# Patient Record
Sex: MALE | Race: WHITE | NOT HISPANIC OR LATINO | URBAN - METROPOLITAN AREA
[De-identification: names, ages, dates, MRNs, and addresses within clinical notes are randomized per-mention and may not be internally consistent; named-entity substitution may affect disease eponyms.]

---

## 2020-11-03 ENCOUNTER — OFFICE VISIT (OUTPATIENT)
Dept: URBAN - METROPOLITAN AREA CLINIC 121 | Facility: CLINIC | Age: 70
End: 2020-11-03

## 2021-01-13 ENCOUNTER — OFFICE VISIT (OUTPATIENT)
Dept: URBAN - METROPOLITAN AREA CLINIC 63 | Facility: CLINIC | Age: 71
End: 2021-01-13

## 2021-11-01 ENCOUNTER — OFFICE VISIT (OUTPATIENT)
Dept: URBAN - METROPOLITAN AREA CLINIC 121 | Facility: CLINIC | Age: 71
End: 2021-11-01

## 2022-02-23 ENCOUNTER — OFFICE VISIT (OUTPATIENT)
Dept: URBAN - METROPOLITAN AREA CLINIC 63 | Facility: CLINIC | Age: 72
End: 2022-02-23

## 2022-07-09 ENCOUNTER — TELEPHONE ENCOUNTER (OUTPATIENT)
Dept: URBAN - METROPOLITAN AREA CLINIC 121 | Facility: CLINIC | Age: 72
End: 2022-07-09

## 2022-07-09 RX ORDER — FAMOTIDINE 20 MG/1
TABLET ORAL
Refills: 0 | OUTPATIENT
Start: 2022-02-16 | End: 2022-02-23

## 2022-07-09 RX ORDER — ASPIRIN 325 MG/1
TABLET ORAL ONCE A DAY
Refills: 0 | OUTPATIENT
Start: 2019-12-26 | End: 2021-01-13

## 2022-07-09 RX ORDER — ASPIRIN 325 MG/1
TABLET ORAL ONCE A DAY
Refills: 0 | OUTPATIENT
Start: 2017-03-03 | End: 2019-12-26

## 2022-07-09 RX ORDER — OMEGA-3S/DHA/EPA/FISH OIL 980-1400MG
CAPSULE,DELAYED RELEASE (ENTERIC COATED) ORAL ONCE A DAY
Refills: 0 | OUTPATIENT
Start: 2017-03-03 | End: 2019-12-26

## 2022-07-09 RX ORDER — OMEGA-3S/DHA/EPA/FISH OIL 980-1400MG
CAPSULE,DELAYED RELEASE (ENTERIC COATED) ORAL ONCE A DAY
Refills: 0 | OUTPATIENT
Start: 2021-01-13 | End: 2022-02-23

## 2022-07-09 RX ORDER — ATENOLOL 50 MG/1
TABLET ORAL ONCE A DAY
Refills: 0 | OUTPATIENT
Start: 2019-12-26 | End: 2021-01-13

## 2022-07-09 RX ORDER — OMEGA-3S/DHA/EPA/FISH OIL 980-1400MG
CAPSULE,DELAYED RELEASE (ENTERIC COATED) ORAL ONCE A DAY
Refills: 0 | OUTPATIENT
Start: 2019-12-26 | End: 2021-01-13

## 2022-07-09 RX ORDER — OMEPRAZOLE 20 MG/1
CAPSULE, DELAYED RELEASE ORAL ONCE A DAY
Refills: 0 | OUTPATIENT
Start: 2019-12-26 | End: 2019-12-26

## 2022-07-09 RX ORDER — TELMISARTAN 40 MG/1
TABLET ORAL ONCE A DAY
Refills: 0 | OUTPATIENT
Start: 2019-09-23 | End: 2019-12-26

## 2022-07-09 RX ORDER — CELECOXIB 200 MG/1
CAPSULE ORAL ONCE A DAY
Refills: 0 | OUTPATIENT
Start: 2021-01-13 | End: 2022-02-23

## 2022-07-09 RX ORDER — AMLODIPINE BESYLATE 5 MG/1
TABLET ORAL ONCE A DAY
Refills: 0 | OUTPATIENT
Start: 2017-03-03 | End: 2019-12-26

## 2022-07-09 RX ORDER — OMEPRAZOLE 20 MG/1
CAPSULE, DELAYED RELEASE ORAL ONCE A DAY
Refills: 0 | OUTPATIENT
Start: 2017-03-03 | End: 2019-12-26

## 2022-07-09 RX ORDER — NIACIN 1000 MG
1500 MG TABLET, EXTENDED RELEASE ORAL TWICE A DAY
Refills: 0 | OUTPATIENT
Start: 2019-12-26 | End: 2019-12-26

## 2022-07-09 RX ORDER — ALLOPURINOL 200 MG/1
TABLET ORAL ONCE A DAY
Refills: 0 | OUTPATIENT
Start: 2019-11-02 | End: 2019-12-26

## 2022-07-09 RX ORDER — FAMOTIDINE 40 MG/1
TWICE A DAY TABLET, FILM COATED ORAL TWICE A DAY
Refills: 3 | OUTPATIENT
Start: 2021-01-13 | End: 2022-02-23

## 2022-07-09 RX ORDER — AMLODIPINE BESYLATE 5 MG/1
TABLET ORAL ONCE A DAY
Refills: 0 | OUTPATIENT
Start: 2021-01-13 | End: 2022-02-23

## 2022-07-09 RX ORDER — ATENOLOL 50 MG/1
TABLET ORAL ONCE A DAY
Refills: 0 | OUTPATIENT
Start: 2017-03-03 | End: 2019-12-26

## 2022-07-09 RX ORDER — TELMISARTAN 40 MG/1
TABLET ORAL ONCE A DAY
Refills: 0 | OUTPATIENT
Start: 2021-01-13 | End: 2022-02-23

## 2022-07-09 RX ORDER — ATENOLOL 50 MG/1
TABLET ORAL ONCE A DAY
Refills: 0 | OUTPATIENT
Start: 2021-01-13 | End: 2022-02-23

## 2022-07-09 RX ORDER — FAMOTIDINE 40 MG/1
TWICE A DAY TABLET, FILM COATED ORAL TWICE A DAY
Refills: 3 | OUTPATIENT
Start: 2019-12-26 | End: 2021-01-06

## 2022-07-09 RX ORDER — ROSUVASTATIN CALCIUM 5 MG/1
TABLET, FILM COATED ORAL ONCE A DAY
Refills: 0 | OUTPATIENT
Start: 2021-01-13 | End: 2022-02-23

## 2022-07-09 RX ORDER — ALLOPURINOL 100 MG/1
TABLET ORAL TWICE A DAY
Refills: 0 | OUTPATIENT
Start: 2021-01-13 | End: 2022-02-23

## 2022-07-09 RX ORDER — FAMOTIDINE 10 MG
TAKE 1 TABLET TWICE A DAY TABLET ORAL
Refills: 0 | OUTPATIENT
Start: 2019-04-25 | End: 2019-07-16

## 2022-07-09 RX ORDER — VALSARTAN 80 MG
TABLET ORAL ONCE A DAY
Refills: 0 | OUTPATIENT
Start: 2019-12-26 | End: 2019-12-26

## 2022-07-09 RX ORDER — NIACIN 1000 MG
1500 MG TABLET, EXTENDED RELEASE ORAL TWICE A DAY
Refills: 0 | OUTPATIENT
Start: 2017-03-03 | End: 2019-12-26

## 2022-07-09 RX ORDER — FAMOTIDINE 40 MG/1
TAKE 1 TABLET BY MOUTH TWICE A DAY TABLET, FILM COATED ORAL
Refills: 0 | OUTPATIENT
Start: 2021-01-06 | End: 2021-01-13

## 2022-07-09 RX ORDER — AMLODIPINE BESYLATE 5 MG/1
TABLET ORAL ONCE A DAY
Refills: 0 | OUTPATIENT
Start: 2019-12-26 | End: 2021-01-13

## 2022-07-09 RX ORDER — VALSARTAN 80 MG
TABLET ORAL ONCE A DAY
Refills: 0 | OUTPATIENT
Start: 2017-03-03 | End: 2019-12-26

## 2022-07-09 RX ORDER — ASPIRIN 325 MG/1
TABLET ORAL ONCE A DAY
Refills: 0 | OUTPATIENT
Start: 2021-01-13 | End: 2022-02-23

## 2022-07-10 ENCOUNTER — TELEPHONE ENCOUNTER (OUTPATIENT)
Dept: URBAN - METROPOLITAN AREA CLINIC 121 | Facility: CLINIC | Age: 72
End: 2022-07-10

## 2022-07-10 RX ORDER — AMLODIPINE BESYLATE 5 MG/1
TABLET ORAL ONCE A DAY
Refills: 0 | Status: ACTIVE | COMMUNITY
Start: 2022-02-23

## 2022-07-10 RX ORDER — ASPIRIN 325 MG/1
TABLET ORAL ONCE A DAY
Refills: 0 | Status: ACTIVE | COMMUNITY
Start: 2022-02-23

## 2022-07-10 RX ORDER — TELMISARTAN 40 MG/1
TABLET ORAL ONCE A DAY
Refills: 0 | Status: ACTIVE | COMMUNITY
Start: 2019-12-26

## 2022-07-10 RX ORDER — ALLOPURINOL 100 MG/1
TABLET ORAL TWICE A DAY
Refills: 0 | Status: ACTIVE | COMMUNITY
Start: 2022-02-23

## 2022-07-10 RX ORDER — ATENOLOL 50 MG/1
TABLET ORAL ONCE A DAY
Refills: 0 | Status: ACTIVE | COMMUNITY
Start: 2022-02-23

## 2022-07-10 RX ORDER — OMEGA-3S/DHA/EPA/FISH OIL 980-1400MG
CAPSULE,DELAYED RELEASE (ENTERIC COATED) ORAL ONCE A DAY
Refills: 0 | Status: ACTIVE | COMMUNITY
Start: 2022-02-23

## 2022-07-10 RX ORDER — CELECOXIB 200 MG/1
CAPSULE ORAL ONCE A DAY
Refills: 0 | Status: ACTIVE | COMMUNITY
Start: 2022-02-23

## 2022-07-10 RX ORDER — FAMOTIDINE 20 MG/1
TABLET ORAL TWICE A DAY
Refills: 0 | Status: ACTIVE | COMMUNITY
Start: 2022-02-23

## 2022-07-10 RX ORDER — FAMOTIDINE 10 MG
TAKE 1 TABLET TWICE A DAY TABLET ORAL
Refills: 0 | Status: ACTIVE | COMMUNITY
Start: 2019-07-16

## 2022-07-10 RX ORDER — ALLOPURINOL 200 MG/1
TABLET ORAL ONCE A DAY
Refills: 0 | Status: ACTIVE | COMMUNITY
Start: 2019-12-26

## 2022-07-10 RX ORDER — TELMISARTAN 40 MG/1
TABLET ORAL ONCE A DAY
Refills: 0 | Status: ACTIVE | COMMUNITY
Start: 2022-02-23

## 2022-07-10 RX ORDER — ROSUVASTATIN CALCIUM 5 MG/1
TABLET, FILM COATED ORAL ONCE A DAY
Refills: 0 | Status: ACTIVE | COMMUNITY
Start: 2022-02-23

## 2024-07-29 ENCOUNTER — APPOINTMENT (OUTPATIENT)
Dept: GENERAL RADIOLOGY | Facility: HOSPITAL | Age: 74
End: 2024-07-29
Attending: PHYSICIAN ASSISTANT
Payer: COMMERCIAL

## 2024-07-29 ENCOUNTER — HOSPITAL ENCOUNTER (EMERGENCY)
Facility: HOSPITAL | Age: 74
Discharge: HOME | End: 2024-07-29
Attending: PHYSICIAN ASSISTANT | Admitting: PHYSICIAN ASSISTANT
Payer: COMMERCIAL

## 2024-07-29 VITALS
HEIGHT: 72 IN | WEIGHT: 267.8 LBS | SYSTOLIC BLOOD PRESSURE: 153 MMHG | DIASTOLIC BLOOD PRESSURE: 78 MMHG | TEMPERATURE: 98.1 F | HEART RATE: 64 BPM | RESPIRATION RATE: 16 BRPM | BODY MASS INDEX: 36.27 KG/M2 | OXYGEN SATURATION: 98 %

## 2024-07-29 DIAGNOSIS — L03.115 CELLULITIS OF RIGHT FOOT: ICD-10-CM

## 2024-07-29 DIAGNOSIS — S99.921D FOOT TRAUMA, RIGHT, SUBSEQUENT ENCOUNTER: ICD-10-CM

## 2024-07-29 PROBLEM — M17.11 OSTEOARTHRITIS OF RIGHT KNEE: Status: ACTIVE | Noted: 2018-11-08

## 2024-07-29 PROBLEM — C61 PROSTATE CANCER (H): Status: ACTIVE | Noted: 2017-04-18

## 2024-07-29 PROBLEM — K21.9 GERD (GASTROESOPHAGEAL REFLUX DISEASE): Status: ACTIVE | Noted: 2018-11-08

## 2024-07-29 PROBLEM — I10 ESSENTIAL HYPERTENSION: Status: ACTIVE | Noted: 2018-11-08

## 2024-07-29 PROBLEM — E78.5 HYPERLIPIDEMIA: Status: ACTIVE | Noted: 2018-11-08

## 2024-07-29 PROCEDURE — 10060 I&D ABSCESS SIMPLE/SINGLE: CPT | Performed by: PHYSICIAN ASSISTANT

## 2024-07-29 PROCEDURE — 96372 THER/PROPH/DIAG INJ SC/IM: CPT | Performed by: PHYSICIAN ASSISTANT

## 2024-07-29 PROCEDURE — 87205 SMEAR GRAM STAIN: CPT | Performed by: PHYSICIAN ASSISTANT

## 2024-07-29 PROCEDURE — 999N000104 HC STATISTIC NO CHARGE

## 2024-07-29 PROCEDURE — 250N000011 HC RX IP 250 OP 636: Performed by: PHYSICIAN ASSISTANT

## 2024-07-29 PROCEDURE — 73630 X-RAY EXAM OF FOOT: CPT | Mod: RT

## 2024-07-29 PROCEDURE — 10060 I&D ABSCESS SIMPLE/SINGLE: CPT

## 2024-07-29 RX ORDER — GLUCOSA SU 2KCL/CHONDROITIN SU 500-400 MG
1 CAPSULE ORAL
COMMUNITY

## 2024-07-29 RX ORDER — FAMOTIDINE 20 MG/1
TABLET, FILM COATED ORAL
COMMUNITY
Start: 2024-07-02

## 2024-07-29 RX ORDER — ROSUVASTATIN CALCIUM 5 MG/1
TABLET, COATED ORAL
COMMUNITY
Start: 2024-07-02

## 2024-07-29 RX ORDER — ATENOLOL 50 MG/1
50 TABLET ORAL
COMMUNITY

## 2024-07-29 RX ORDER — CEFTRIAXONE SODIUM 1 G
1 VIAL (EA) INJECTION ONCE
Status: COMPLETED | OUTPATIENT
Start: 2024-07-29 | End: 2024-07-29

## 2024-07-29 RX ORDER — CHLORAL HYDRATE 500 MG
1 CAPSULE ORAL
COMMUNITY

## 2024-07-29 RX ORDER — AMLODIPINE BESYLATE 5 MG/1
5 TABLET ORAL
COMMUNITY

## 2024-07-29 RX ORDER — GLUCOSA SU 2KCL/CHONDROITIN SU 500-400 MG
1 CAPSULE ORAL DAILY
COMMUNITY

## 2024-07-29 RX ORDER — ALLOPURINOL 100 MG/1
TABLET ORAL
COMMUNITY
Start: 2024-07-02

## 2024-07-29 RX ORDER — CEFADROXIL 500 MG/1
500 CAPSULE ORAL 2 TIMES DAILY
Qty: 14 CAPSULE | Refills: 0 | Status: SHIPPED | OUTPATIENT
Start: 2024-07-29 | End: 2024-08-05

## 2024-07-29 RX ORDER — CELECOXIB 200 MG/1
CAPSULE ORAL
COMMUNITY
Start: 2024-07-03

## 2024-07-29 RX ORDER — TELMISARTAN 40 MG/1
40 TABLET ORAL
COMMUNITY

## 2024-07-29 RX ORDER — ATORVASTATIN CALCIUM 10 MG/1
5 TABLET, FILM COATED ORAL
COMMUNITY

## 2024-07-29 RX ADMIN — CEFTRIAXONE 1 G: 1 INJECTION, POWDER, FOR SOLUTION INTRAMUSCULAR; INTRAVENOUS at 11:09

## 2024-07-29 ASSESSMENT — ACTIVITIES OF DAILY LIVING (ADL)
ADLS_ACUITY_SCORE: 35
ADLS_ACUITY_SCORE: 35

## 2024-07-29 NOTE — ED PROVIDER NOTES
History     Chief Complaint   Patient presents with    Foot Pain     HPI  Calderon Miller is a 73 year old male who presents to urgent care for eval ration of right foot injury.  Patient states that [place] 10 [place]  Patient states he was seen at [place] urgent [facility] and was started on course of doxycycline.  At that time he also had x-rays done which did not show any fracture to his right foot.  Patient states that since finishing the course of doxycycline that his redness, warmth and pain in his foot has never improved much and has now started to become worse.  Patient denies any fevers, chills, ascending red streaks up his leg, or any other associated symptoms.  Patient denies any history of diabetes, peripheral edema, or circulation issues to lower extremities.    Allergies:  Allergies   Allergen Reactions    Penicillins Rash    Sulfamethoxazole-Trimethoprim Rash       Problem List:    Patient Active Problem List    Diagnosis Date Noted    Essential hypertension 11/08/2018     Priority: Medium     Last Assessment & Plan:    Formatting of this note might be different from the original.   Stable for discharge      GERD (gastroesophageal reflux disease) 11/08/2018     Priority: Medium    Hyperlipidemia 11/08/2018     Priority: Medium     Last Assessment & Plan:    Formatting of this note might be different from the original.   Stable for discharge      Osteoarthritis of right knee 11/08/2018     Priority: Medium     Last Assessment & Plan:    Formatting of this note might be different from the original.   Postop day #1 right total knee arthroplasty   Surgical dressing clean and dry   Postop pain in good control   And is to discharge patient home after second joint camp      Prostate cancer (H) 04/18/2017     Priority: Medium    Rupture of right distal biceps tendon 10/20/2016     Priority: Medium        Past Medical History:    No past medical history on  file.    Past Surgical History:    No past surgical history on file.    Family History:    No family history on file.    Social History:  Marital Status:   [2]        Medications:    allopurinol (ZYLOPRIM) 100 MG tablet  cefadroxil (DURICEF) 500 MG capsule  celecoxib (CELEBREX) 200 MG capsule  famotidine (PEPCID) 20 MG tablet  rosuvastatin (CRESTOR) 5 MG tablet  amLODIPine (NORVASC) 5 MG tablet  atenolol (TENORMIN) 50 MG tablet  atorvastatin (LIPITOR) 10 MG tablet  fish oil-omega-3 fatty acids 1000 MG capsule  Multiple Vitamins-Minerals (THERAPEUTIC-M) TABS  Multiple Vitamins-Minerals (THERAPEUTIC-M) TABS  telmisartan (MICARDIS) 40 MG tablet          Review of Systems   Musculoskeletal:         Right foot trauma   All other systems reviewed and are negative.      Physical Exam   BP: 153/78  Pulse: 64  Temp: 98.1  F (36.7  C)  Resp: 16  Height: 182.9 cm (6')  Weight: 121.5 kg (267 lb 12.8 oz)  SpO2: 98 %      Physical Exam  Vitals and nursing note reviewed.   Constitutional:       General: He is not in acute distress.     Appearance: Normal appearance. He is not ill-appearing or toxic-appearing.   Cardiovascular:      Rate and Rhythm: Regular rhythm.      Heart sounds: Normal heart sounds.   Pulmonary:      Breath sounds: Normal breath sounds.   Musculoskeletal:        Feet:    Feet:      Comments: Patient has approximately golf ball sized area of fluctuance present over the medial aspect of base of great toe of right foot.  There is surrounding erythema that is warm to the touch.  Patient also has a large area of callus on the plantar aspect of base of right great toe along with a approximately 1 cm laceration.  Patient does have full range of motion and normal strength of right foot and all digits.  CMS intact.        ED Course        [facility]    PROCEDURE: -Incision/Drainage    Date/Time: 7/29/2024 11:28 AM    Performed by: Colton Ramos PA-C  Authorized by: Colton Ramos PA-C     Risks, benefits and alternatives discussed.      LOCATION:      Type:  [place]    Size:  4x3cm    Location:  Lower extremity    Lower extremity location:  Foot    Foot location:  R foot    PRE-PROCEDURE DETAILS:     Skin preparation:  Betadine    PROCEDURE TYPE:     Complexity:  Simple    ANESTHESIA (see MAR for exact dosages):     Anesthesia method:  None    PROCEDURE DETAILS:     Needle aspiration: no      Incision types:  Stab incision    Incision depth:  Dermal    Scalpel blade:  11    Wound management:  Probed and deloculated    Drainage:  Purulent and bloody    Drainage amount:  Scant    Wound treatment:  Wound left open    Packing materials:  None               Critical Care time:               Results for orders placed or performed during the hospital encounter of 07/29/24 (from the past 24 hour(s))   XR Foot Right 3 Views    Narrative    PROCEDURE:  XR FOOT RIGHT G/E 3 VIEWS    HISTORY: trauma    COMPARISON: No relevant priors available for comparison    TECHNIQUE:  XR FOOT RIGHT 3 VIEWS    FINDINGS:   No acute fracture or dislocation is identified. No suspicious osseous  lesion. Moderate to advanced degenerative changes of the TMT joints  and midfoot. Large osteophyte projects medially off the medial  cuneiform. Plantar fascia calcifications and calcaneal plantar  enthesopathy. Calcaneal Achilles enthesopathy. Tibiotalar joint space  narrowing.    No foreign body or subcutaneous emphysema.        Impression    IMPRESSION:   No acute osseous abnormality.    ALFONSO DIAZ MD         SYSTEM ID:  [id number]       Medications   cefTRIAXone (ROCEPHIN) in lidocaine 1% (PF) for IM administration 1 g (1 g Intramuscular $Given 7/29/24 1109)       Assessments & Plan (with Medical Decision Making)   #1.  Foot trauma, right, subsequent encounter   #2.  Cellulitis of right foot    Discussed exam findings with patient.  Patient had bulla drained on right foot today in urgent care; please refer to procedure note.  Patient  was given ceftriaxone 1 g IM while in urgent care and had no adverse reactions to this.  Patient was discharged with prescription for cefadroxil.  Area of infection was outlined with surgical marker and patient is to monitor this for any spreading.  If area of infection is spreading he should return to emergency department immediately.  Wound care is discussed extensively with patient.  Any additional concerns patient can return to urgent care or follow-up with primary care provider.  Patient verbalized understanding and agreement the plan.      I have reviewed the nursing notes.    I have reviewed the findings, diagnosis, plan and need for follow up with the patient.                New Prescriptions    CEFADROXIL (DURICEF) 500 MG CAPSULE    Take 1 capsule (500 mg) by mouth 2 times daily for 7 days       Final diagnoses:   Foot trauma, right, subsequent encounter   Cellulitis of right foot       7/29/2024   [facility]       Colton Ramos PA-C  07/29/24 1137

## 2024-07-29 NOTE — ED TRIAGE NOTES
Pt presents with c/o right foot pain. [place] Was seen and treated for wound by an urgent care in [place]. Reports wound is now more on the side and red/swollen. States he finished prescribed abx without relief. Pt has taken a couple tylenol but no otc meds today. Pt able to ambulate to room.

## 2024-08-03 LAB
BACTERIA WND CULT: ABNORMAL
BACTERIA WND CULT: ABNORMAL
GRAM STAIN RESULT: ABNORMAL
GRAM STAIN RESULT: ABNORMAL

## 2024-11-21 ENCOUNTER — P2P PATIENT RECORD (OUTPATIENT)
Age: 74
End: 2024-11-21

## 2024-11-22 ENCOUNTER — TELEPHONE ENCOUNTER (OUTPATIENT)
Dept: URBAN - METROPOLITAN AREA CLINIC 64 | Facility: CLINIC | Age: 74
End: 2024-11-22

## 2024-11-26 ENCOUNTER — OFFICE VISIT (OUTPATIENT)
Dept: URBAN - METROPOLITAN AREA CLINIC 60 | Facility: CLINIC | Age: 74
End: 2024-11-26
Payer: MEDICARE

## 2024-11-26 ENCOUNTER — LAB OUTSIDE AN ENCOUNTER (OUTPATIENT)
Dept: URBAN - METROPOLITAN AREA CLINIC 60 | Facility: CLINIC | Age: 74
End: 2024-11-26

## 2024-11-26 ENCOUNTER — DASHBOARD ENCOUNTERS (OUTPATIENT)
Age: 74
End: 2024-11-26

## 2024-11-26 VITALS
TEMPERATURE: 97.4 F | DIASTOLIC BLOOD PRESSURE: 80 MMHG | SYSTOLIC BLOOD PRESSURE: 128 MMHG | OXYGEN SATURATION: 98 % | HEIGHT: 72 IN | BODY MASS INDEX: 32.78 KG/M2 | WEIGHT: 242 LBS | HEART RATE: 64 BPM

## 2024-11-26 DIAGNOSIS — Z86.0102 HISTORY OF HYPERPLASTIC POLYP OF COLON: ICD-10-CM

## 2024-11-26 DIAGNOSIS — R79.89 ELEVATED LFTS: ICD-10-CM

## 2024-11-26 DIAGNOSIS — R94.8 ABNORMAL BILIARY HIDA SCAN: ICD-10-CM

## 2024-11-26 DIAGNOSIS — Z12.11 COLON CANCER SCREENING: ICD-10-CM

## 2024-11-26 DIAGNOSIS — K71.9 DRUG-INDUCED LIVER INJURY: ICD-10-CM

## 2024-11-26 PROBLEM — 428882003: Status: ACTIVE | Noted: 2024-11-26

## 2024-11-26 PROBLEM — 427399008: Status: ACTIVE | Noted: 2024-11-26

## 2024-11-26 PROCEDURE — 99204 OFFICE O/P NEW MOD 45 MIN: CPT

## 2024-11-26 RX ORDER — ROSUVASTATIN CALCIUM 5 MG/1
TABLET, FILM COATED ORAL
Qty: 90 TABLET | Status: ON HOLD | COMMUNITY

## 2024-11-26 RX ORDER — IBUPROFEN 200 MG
1 TABLET WITH FOOD CAPSULE ORAL TWICE A DAY
Status: ON HOLD | COMMUNITY

## 2024-11-26 RX ORDER — APALUTAMIDE 240 MG/1
1 TABLET TABLET, FILM COATED ORAL ONCE A DAY
Status: ON HOLD | COMMUNITY

## 2024-11-26 RX ORDER — PSYLLIUM HUSK (WITH SUGAR) 3 G/12 G
AS DIRECTED POWDER (GRAM) ORAL
Status: ON HOLD | COMMUNITY

## 2024-11-26 RX ORDER — ATENOLOL 50 MG/1
TABLET ORAL
Qty: 90 TABLET | Status: ACTIVE | COMMUNITY

## 2024-11-26 RX ORDER — AMLODIPINE BESYLATE 5 MG/1
TABLET ORAL
Qty: 90 TABLET | Status: ON HOLD | COMMUNITY

## 2024-11-26 RX ORDER — SWAB
2 CAPSULES SWAB, NON-MEDICATED MISCELLANEOUS ONCE A DAY
Status: ON HOLD | COMMUNITY

## 2024-11-26 RX ORDER — FAMOTIDINE 20 MG/1
TABLET, FILM COATED ORAL
Qty: 180 TABLET | Status: ON HOLD | COMMUNITY

## 2024-11-26 RX ORDER — TELMISARTAN 40 MG/1
TABLET ORAL
Qty: 90 TABLET | Status: ON HOLD | COMMUNITY

## 2024-11-26 NOTE — HPI-PREVIOUS PROCEDURES
Colonoscopy by Dr. Soto 3/22/2017 with 5 mm polyp in the rectum, five 5 to 7 mm polyps in the rectum, diverticulosis, nonbleeding external and internal hemorrhoids. Polyps all hyperplastic.

## 2024-11-26 NOTE — HPI-PREVIOUS IMAGING
MRI 11/14/2024 with no hepatic steatosis seen, Riedel's lobe versus hepatomegaly with liver measuring 22 cm, gallbladder wall thickening, no choledocholithiasis seen, no biliary ductal dilatation, pancreas normal, bowel nondilated  HIDA scan 11/14/2024 with nonvisualization of the gallbladder

## 2024-11-26 NOTE — HPI-TODAY'S VISIT:
Patient is a 74-year-old  male coming in for chief complaint of fatty liver and elevated LFTs.  He was last seen by KINZA Perry 2/23/2022 for reflux follow-up.  EGD 3/22/2017 with normal esophagus, small hiatal hernia, acute gastritis, few gastric polyps, single duodenal polyp and duodenal erosions without bleeding.  Stomach biopsy with mild surface reactive foveolar changes negative for H. pylori.  Duodenal biopsy negative for any acute abnormalities.  Patient admitted 11/13/2024 to 11/21/2024 for drug-induced liver injury. He did his routine yearly labs and was instructed to come to the ER because his liver enzymes were elevated. Did comment over the last 10 days he had increasing fatigue, fever chills and headache. MRI and MRCP showing nonspecific gallbladder wall thickening with HIDA scan with nonvisualization of gallbladder.   Patient comes in accompanied by his wife for hospital follow-up visit.  We review his labs imaging and liver biopsy results together and I answered all his questions to the best of my ability.  He is feeling well after discharge.  Previously having issues with overwhelming fatigue now much better.  Bowel movements are regular without pain or bleeding.  Denies any abdominal pain, nausea, vomiting, fever, chills or unintentional weight loss.  He is due for colonoscopy with polypectomy hold off for now and I agree.  We will consider scheduling a follow-up visit.  Patient found to have possible acute cholecystitis due to thickening of gallbladder seen on MRI as well as nonvisualization of gallbladder seen on HIDA scan.  They decided while he was inpatient that his liver was not strong enough to withstand surgery so he was given the name of a surgeon and told to follow-up outpatient.  Patient relates at today's visit he still has not reached out to the surgeon and I encouraged him to call for consultation to establish.  We review his medications that could have caused a drug-induced liver injury.  Likely was antibiotics that patient was given a few days previous to the lab findings.

## 2024-11-26 NOTE — HPI-PREVIOUS LABS
Patient had labs done 11/14/2024 with ZUNILDA, AMA, ASMA, LK M1, ferritin, iron, TIBC, alpha-1 antitrypsin all normal.  Hemochromatosis pending.  Hepatitis panel negative.  Pathology from liver biopsy done 11/18/2024 biopsy consistent with cholestatic hepatitis with liver points to surveilled, portal inflammation, negative iron stain, negative alpha-1 antitrypsin, mild pleural fibrosis etiologies including hepatotoxicity, infections, acute hepatitis not favorable histologically for autoimmune hepatitis.  Labs 11/21/2024 with hemoglobin 10.5, hematocrit 32.1, MCV 95.0 and platelet 275 CMP with , , total bilirubin 5.0 and alk phos 318  Labs 11/20/2024 with CMP showing , , total bilirubin 7.6 and alk phos 343

## 2024-11-27 ENCOUNTER — TELEPHONE ENCOUNTER (OUTPATIENT)
Dept: URBAN - METROPOLITAN AREA CLINIC 63 | Facility: CLINIC | Age: 74
End: 2024-11-27

## 2025-02-27 ENCOUNTER — OFFICE VISIT (OUTPATIENT)
Dept: URBAN - METROPOLITAN AREA CLINIC 63 | Facility: CLINIC | Age: 75
End: 2025-02-27
Payer: MEDICARE

## 2025-02-27 VITALS
DIASTOLIC BLOOD PRESSURE: 84 MMHG | HEART RATE: 79 BPM | HEIGHT: 72 IN | TEMPERATURE: 97 F | BODY MASS INDEX: 33.86 KG/M2 | WEIGHT: 250 LBS | SYSTOLIC BLOOD PRESSURE: 148 MMHG | OXYGEN SATURATION: 97 %

## 2025-02-27 DIAGNOSIS — R94.8 ABNORMAL BILIARY HIDA SCAN: ICD-10-CM

## 2025-02-27 DIAGNOSIS — K71.9 DRUG-INDUCED LIVER INJURY: ICD-10-CM

## 2025-02-27 DIAGNOSIS — R79.89 ELEVATED LFTS: ICD-10-CM

## 2025-02-27 DIAGNOSIS — Z12.11 COLON CANCER SCREENING: ICD-10-CM

## 2025-02-27 PROCEDURE — 99214 OFFICE O/P EST MOD 30 MIN: CPT

## 2025-02-27 RX ORDER — VENLAFAXINE HYDROCHLORIDE 100 MG/1
1 TABLET WITH FOOD TABLET ORAL ONCE A DAY
Status: ACTIVE | COMMUNITY

## 2025-02-27 RX ORDER — APALUTAMIDE 240 MG/1
1 TABLET TABLET, FILM COATED ORAL ONCE A DAY
Status: ACTIVE | COMMUNITY

## 2025-02-27 RX ORDER — ATENOLOL 50 MG/1
TABLET ORAL
Qty: 90 TABLET | Status: ACTIVE | COMMUNITY

## 2025-02-27 NOTE — HPI-PREVIOUS LABS
Labs 1/7/2025 LFT panel WNL  Labs 12/3/2024 CMP remarkable for total protein 6.1 (L), total bilirubin 1.9 (H), liver enzymes back in range CBC unremarkable  Patient had labs done 11/14/2024 with ZUNILDA, AMA, ASMA, LK M1, ferritin, iron, TIBC, alpha-1 antitrypsin all normal.  Hemochromatosis pending.  Hepatitis panel negative.  Pathology from liver biopsy done 11/18/2024 biopsy consistent with cholestatic hepatitis with liver points to surveilled, portal inflammation, negative iron stain, negative alpha-1 antitrypsin, mild pleural fibrosis etiologies including hepatotoxicity, infections, acute hepatitis not favorable histologically for autoimmune hepatitis.  Labs 11/21/2024 with hemoglobin 10.5, hematocrit 32.1, MCV 95.0 and platelet 275 CMP with , , total bilirubin 5.0 and alk phos 318  Labs 11/20/2024 with CMP showing , , total bilirubin 7.6 and alk phos 343

## 2025-04-03 ENCOUNTER — OFFICE VISIT (OUTPATIENT)
Dept: URBAN - METROPOLITAN AREA CLINIC 63 | Facility: CLINIC | Age: 75
End: 2025-04-03